# Patient Record
Sex: FEMALE | Race: WHITE
[De-identification: names, ages, dates, MRNs, and addresses within clinical notes are randomized per-mention and may not be internally consistent; named-entity substitution may affect disease eponyms.]

---

## 2019-11-08 ENCOUNTER — HOSPITAL ENCOUNTER (OUTPATIENT)
Dept: HOSPITAL 96 - M.SLEEPLAB | Age: 49
End: 2019-11-08
Attending: NURSE PRACTITIONER
Payer: COMMERCIAL

## 2019-11-08 DIAGNOSIS — G47.34: ICD-10-CM

## 2019-11-08 DIAGNOSIS — G47.33: Primary | ICD-10-CM

## 2019-11-12 NOTE — SLEEP
[address]  [address]                    SLEEP STUDY REPORT            
_______________________________________________________________________________
 
Name:       ROBIN HERNANDEZ                Room:                      REG CLI 
M.R.#:  [id number]      Account #:      [id number]  
Admission:  11/08/19     Attend Phys:    MARIA LUISA VILLEGAS        
Discharge:               Date of Birth:  [birth year]  
         Report #: [id number]
                                                                     [id number]  
_______________________________________________________________________________
THIS REPORT FOR:  //name//                      
 
CC: HECTOR Jaeger
 
This study has been reviewed in its entirety by a board certified sleep
specialist
 
DATE OF SERVICE:  11/08/2019
 
 
SLEEP STUDY
 
REFERRING PHYSICIAN:  Dr. Maria Luisa Villegas.
 
The patient is a 49-year-old who weighs 130 pounds with a BMI of 21.6.  The
patient's [place] score was 16.  The patient underwent diagnostic sleep study
performed at [facility].
 
During the night study, the patient spent 433 minutes in bed and slept for 418
minutes with a normal sleep efficiency of 96%.  Sleep latency was 5 minutes with
a REM latency of 64.5 minutes.  Sleep architecture showed normal stage 1 and
stage 2 sleep, normal slow wave and normal REM sleep.
 
During the night of the study, the patient had 23 obstructive apneas, no mixed
apneas, 2 central apneas and 12 hypopneas.  The patient's apnea hypopnea index
was 5.3 per hour with a REM index of 21.6 per hour and a supine index of 10 per
hour.
 
EKG monitoring revealed an average heart rate of 69 beats per minute.  No
sustained arrhythmias observed.
 
PLMS were not observed.
 
Nocturnal oximetry study revealed an average oxygen saturation of 97% with a
lowest of 87%.  Only 0.2 minutes were spent in oxygen saturation of less than
89%.
 
Due to low AHI, the patient did not meet the split night criteria for CPAP
initiation.
 
IMPRESSION:
1.  Mild sleep apnea-hypopnea syndrome with moderate increase during REM sleep. 
Total AHI of 5.3 per hour with a REM AHI of 21.6 per hour.
2.  No clinically significant nocturnal hypoxia.
 
 
 
[place] [address]                    SLEEP STUDY REPORT            
_______________________________________________________________________________
 
Name:       ROBIN HERNANDEZ                Room:                      REG CLI 
M.R.#:  [id number]      Account #:      [id number]  
Admission:  11/08/19     Attend Phys:    MARIA LUISA VILLEGAS        
Discharge:               Date of Birth:  [birth year]  
         Report #: [id number]
                                                                     [id number]  
_______________________________________________________________________________
3.  No clinically significant periodic limb movements.
 
RECOMMENDATIONS:
1.  The patient did not meet the split night criteria for CPAP initiation due to
low AHI.
2.  The patient is clinically symptomatic with subjective hypersomnia with an
[place] score of 16.  I would recommend treating the patient's sleep apnea, even
though mild with either oral appliance versus trial of CPAP.
3.  Once the patient is optimally treated, then follow up in 4-6 weeks to assess
compliance with treatment and to document clinical improvement.
4.  Avoid CNS depressants.
5.  Cautioned regarding driving until the patient's hypersomnia is resolved.
 
 
 
 
 
 
 
 
 
 
 
 
 
 
 
 
 
 
 
 
 
 
 
 
 
 
 
 
 
 
 
 
<ELECTRONICALLY SIGNED>
                                        By:  Thaddeus Deutsch MD              
11/12/19     [id number]
D: 11/11/19 1554_______________________________________
T: 11/11/19 [id number] HAILEY Deutsch MD                 /nt